# Patient Record
Sex: FEMALE | Race: WHITE | HISPANIC OR LATINO | Employment: FULL TIME | URBAN - METROPOLITAN AREA
[De-identification: names, ages, dates, MRNs, and addresses within clinical notes are randomized per-mention and may not be internally consistent; named-entity substitution may affect disease eponyms.]

---

## 2023-11-15 PROBLEM — J45.909 ASTHMA (HHS-HCC): Status: ACTIVE | Noted: 2023-11-15

## 2023-11-15 PROBLEM — F41.9 ANXIETY: Status: ACTIVE | Noted: 2023-11-15

## 2023-11-15 PROBLEM — E55.9 VITAMIN D DEFICIENCY: Status: ACTIVE | Noted: 2023-11-15

## 2023-11-21 ENCOUNTER — OFFICE VISIT (OUTPATIENT)
Dept: PRIMARY CARE | Facility: CLINIC | Age: 23
End: 2023-11-21
Payer: COMMERCIAL

## 2023-11-21 VITALS
BODY MASS INDEX: 23.55 KG/M2 | DIASTOLIC BLOOD PRESSURE: 60 MMHG | OXYGEN SATURATION: 100 % | HEIGHT: 62 IN | SYSTOLIC BLOOD PRESSURE: 104 MMHG | WEIGHT: 128 LBS | HEART RATE: 86 BPM | TEMPERATURE: 97.4 F | RESPIRATION RATE: 16 BRPM

## 2023-11-21 DIAGNOSIS — Z00.00 ROUTINE GENERAL MEDICAL EXAMINATION AT A HEALTH CARE FACILITY: ICD-10-CM

## 2023-11-21 DIAGNOSIS — R56.9 CONVULSIONS, UNSPECIFIED CONVULSION TYPE (MULTI): Primary | ICD-10-CM

## 2023-11-21 PROCEDURE — 1036F TOBACCO NON-USER: CPT | Performed by: FAMILY MEDICINE

## 2023-11-21 PROCEDURE — 99395 PREV VISIT EST AGE 18-39: CPT | Performed by: FAMILY MEDICINE

## 2023-11-21 RX ORDER — ARIPIPRAZOLE 2 MG/1
2 TABLET ORAL DAILY
COMMUNITY

## 2023-11-21 RX ORDER — ARIPIPRAZOLE 5 MG/1
5 TABLET ORAL DAILY
COMMUNITY

## 2023-11-21 RX ORDER — ESCITALOPRAM OXALATE 10 MG/1
10 TABLET ORAL DAILY
COMMUNITY

## 2023-11-21 NOTE — PROGRESS NOTES
"Subjective   Patient ID: Bree Johnston is a 23 y.o. female who presents for check up.  Covid vax: x 3  Flu: declined  Has had gardasil     Pap: 2/2023  Lmp: now      In college  In hospital for suicidal thoughts-now gone  Stress at work  Has crisis plan if recurs    HPI  Patient Active Problem List   Diagnosis    Vitamin D deficiency    Asthma    Anxiety    Convulsions, unspecified convulsion type (CMS/HCC)       History reviewed. No pertinent surgical history.    Review of Systems hx szs  This patient has   NO history of recent Covid nor flu symptoms,  NO Fever nor chills,  NO Chest pain, shortness of breath nor paroxysmal nocturnal dyspnea,  NO Nausea, vomiting, nor diarrhea,  NO Hematochezia nor melena,  NO Dysuria, hematuria, nor new incontinence issues  NO new severe headaches nor neurological complaints,  NO new issues with anxiety nor depression nor new psychiatric complaints,  NO suicidal nor homicidal ideations.     OBJECTIVE:  /60   Pulse 86   Temp 36.3 °C (97.4 °F) (Temporal)   Resp 16   Ht 1.575 m (5' 2\")   Wt 58.1 kg (128 lb)   LMP 11/21/2023 (Exact Date)   SpO2 100%   BMI 23.41 kg/m²      General:  alert, oriented, no acute distress.  No obvious skin rashes noted.   No gait disturbance noted.    Mood is pleasant, not tearful, no signs of emotional distress.  Not appearing intoxicated or altered.   No voiced delusions,   Normal, appropriate behavior.    HEENT: Normocephalic, atraumatic,   Pupils round, reactive to light  Extraocular motions intact and wnl  Tympanic membranes normal    Neck: no nuchal rigidity  No masses palpable.  No carotid bruits.  No thyromegaly.    Respiratory: Equal breath sounds  No wheezes,    rales,    nor rhonchi  No respiratory distress.    Heart: Regular rate and rhythm, no    murmurs  no rubs/gallops    Abdomen: no masses palpable, nontender, no rebound nor guarding.    Extremities: NO cyanosis noted, no clubbing.   No edema noted.  2+dorsalis pedis " pulses.    Normal-not antalgic, steady gait.    No visits with results within 3 Month(s) from this visit.   Latest known visit with results is:   [facility] Encounter on 02/06/2020   Component Date Value Ref Range Status    Color, Urine 02/06/2020 YELLOW  STRAW,YELLOW Final    Appearance, Urine 02/06/2020 HAZY  CLEAR Final    Specific Gravity, Urine 02/06/2020 1.021  1.005 - 1.035 Final    pH, Urine 02/06/2020 5.0  5.0 - 8.0 Final    Protein, Urine 02/06/2020 NEGATIVE  NEGATIVE mg/dL Final    Glucose, Urine 02/06/2020 NEGATIVE  NEGATIVE mg/dL Final    Blood, Urine 02/06/2020 NEGATIVE  NEGATIVE Final    Ketones, Urine 02/06/2020 NEGATIVE  NEGATIVE mg/dL Final    Bilirubin, Urine 02/06/2020 NEGATIVE  NEGATIVE Final    Urobilinogen, Urine 02/06/2020 <2.0  0.0 - 1.9 mg/dL Final    Nitrite, Urine 02/06/2020 NEGATIVE  NEGATIVE Final    Leukocyte Esterase, Urine 02/06/2020 NEGATIVE  NEGATIVE Final        Assessment/Plan     Problem List Items Addressed This Visit       Convulsions, unspecified convulsion type (CMS/HCC) - Primary     Other Visit Diagnoses       Routine general medical examination at a health care facility              Had much stress last wk  Agrees to hospital if any thoughts even fleeting of self harm  Verbal agreement  Advised to get flu and covid boost      Labs per pt were good  Cannot see-in [place]  Mood is stable  Next counselor in 1-2024  Psychiatry next mo  Intensive outpt starts tues  Menses reg-today

## 2024-08-07 ENCOUNTER — APPOINTMENT (OUTPATIENT)
Dept: PRIMARY CARE | Facility: CLINIC | Age: 24
End: 2024-08-07
Payer: COMMERCIAL